# Patient Record
Sex: MALE | Race: WHITE | NOT HISPANIC OR LATINO | Employment: FULL TIME | URBAN - METROPOLITAN AREA
[De-identification: names, ages, dates, MRNs, and addresses within clinical notes are randomized per-mention and may not be internally consistent; named-entity substitution may affect disease eponyms.]

---

## 2019-12-20 ENCOUNTER — HOSPITAL ENCOUNTER (EMERGENCY)
Facility: HOSPITAL | Age: 34
Discharge: HOME | End: 2019-12-20
Attending: EMERGENCY MEDICINE | Admitting: EMERGENCY MEDICINE
Payer: COMMERCIAL

## 2019-12-20 VITALS
TEMPERATURE: 98.4 F | DIASTOLIC BLOOD PRESSURE: 78 MMHG | HEART RATE: 88 BPM | OXYGEN SATURATION: 98 % | RESPIRATION RATE: 18 BRPM | WEIGHT: 218 LBS | SYSTOLIC BLOOD PRESSURE: 144 MMHG

## 2019-12-20 DIAGNOSIS — J22 ACUTE RESPIRATORY INFECTION: Primary | ICD-10-CM

## 2019-12-20 PROCEDURE — 99282 EMERGENCY DEPT VISIT SF MDM: CPT

## 2019-12-20 PROCEDURE — 99284 EMERGENCY DEPT VISIT MOD MDM: CPT | Performed by: EMERGENCY MEDICINE

## 2019-12-20 RX ORDER — AZITHROMYCIN 250 MG/1
TABLET, FILM COATED ORAL
Qty: 4 TABLET | Refills: 0 | Status: SHIPPED | OUTPATIENT
Start: 2019-12-20 | End: 2019-12-24

## 2019-12-20 RX ORDER — AZITHROMYCIN 250 MG/1
500 TABLET, FILM COATED ORAL ONCE
Status: COMPLETED | OUTPATIENT
Start: 2019-12-20 | End: 2019-12-20

## 2019-12-20 RX ADMIN — AZITHROMYCIN 500 MG: 250 TABLET, FILM COATED ORAL at 17:37

## 2019-12-20 NOTE — ED PROVIDER NOTES
History  Chief Complaint   Patient presents with    Cough     Pt reports productive cough for the last 1 week with greenish/yellow mucous  Pt reports coughing up blood this morning "deep dark red, clots " Pt sent here from   Pt denies SOB but reprots "weird sensation and feeling like my throat is closed "        History provided by:  Patient   used: No    Cough   Associated symptoms: no chest pain, no fever, no headaches, no rash, no shortness of breath and no wheezing    35year-old otherwise healthy male sent from urgent care for evaluation of respiratory symptoms over the last 2 weeks with some hemoptysis this morning which has since resolved and not recurred  Denies having any shortness of breath, fevers, chills  Cough has been productive with yellow/green mucus  Feels congested  No recent travel or sick contacts, leg pain or edema, recent surgeries, family history of DVT  On exam breath sounds are clear  Oropharynx moist   Heart sounds normal  Patient reports having a chest x-ray to Urgent Care which they told him was unremarkable  Patient has low risk Wells score for PE  Given his respiratory illness symptoms most likely related to airway irritation, occult pneumonia  Plan oral antibiotic, discharge home and discussed returning with any worsening symptoms  None       History reviewed  No pertinent past medical history  History reviewed  No pertinent surgical history  History reviewed  No pertinent family history  I have reviewed and agree with the history as documented  Social History     Tobacco Use    Smoking status: Never Smoker   Substance Use Topics    Alcohol use: Not Currently    Drug use: Not Currently        Review of Systems   Constitutional: Negative for activity change, appetite change, fatigue and fever  Respiratory: Positive for cough  Negative for chest tightness, shortness of breath and wheezing  Cardiovascular: Negative for chest pain  Gastrointestinal: Negative for abdominal pain, nausea and vomiting  Musculoskeletal: Negative for back pain and neck pain  Skin: Negative for color change and rash  Neurological: Negative for dizziness, weakness, light-headedness and headaches  All other systems reviewed and are negative  Physical Exam  Physical Exam   Constitutional: He is oriented to person, place, and time  He appears well-developed and well-nourished  No distress  HENT:   Head: Normocephalic  Mouth/Throat: Oropharynx is clear and moist    Neck: Normal range of motion  Neck supple  Cardiovascular: Normal rate and regular rhythm  Pulmonary/Chest: Effort normal and breath sounds normal  He has no wheezes  He has no rales  Musculoskeletal: Normal range of motion  He exhibits no edema or tenderness  Neurological: He is alert and oriented to person, place, and time  Skin: Skin is warm and dry  Psychiatric: He has a normal mood and affect  His behavior is normal    Nursing note and vitals reviewed        Vital Signs  ED Triage Vitals [12/20/19 1643]   Temperature Pulse Respirations Blood Pressure SpO2   98 4 °F (36 9 °C) 88 18 144/78 98 %      Temp Source Heart Rate Source Patient Position - Orthostatic VS BP Location FiO2 (%)   Oral Monitor Sitting Left arm --      Pain Score       --           Vitals:    12/20/19 1643   BP: 144/78   Pulse: 88   Patient Position - Orthostatic VS: Sitting         Visual Acuity      ED Medications  Medications   azithromycin (ZITHROMAX) tablet 500 mg (500 mg Oral Given 12/20/19 1737)       Diagnostic Studies  Results Reviewed     None                 No orders to display              Procedures  Procedures         ED Course                         Sunny' Criteria for PE      Most Recent Value   Wells' Criteria for PE   Clinical signs and symptoms of DVT  0 Filed at: 12/20/2019 1733   PE is primary diagnosis or equally likely  0 Filed at: 12/20/2019 1733   HR >100  0 Filed at: 12/20/2019 1733   Immobilization at least 3 days or Surgery in the previous 4 weeks  0 Filed at: 12/20/2019 1733   Previous, objectively diagnosed PE or DVT  0 Filed at: 12/20/2019 1733   Hemoptysis  1 Filed at: 12/20/2019 1733   Malignancy with treatment within 6 months or palliative  0 Filed at: 12/20/2019 1733   Wells' Criteria Total  1 Filed at: 12/20/2019 1733            [facility]  Number of Diagnoses or Management Options  Acute respiratory infection: new and requires workup  Diagnosis management comments: 40-year-old otherwise healthy male sent from urgent care for evaluation of an episode of hemoptysis this morning  Has not recurred  No shortness of breath, chest pain  He has been sick with a respiratory illness for the past 2 weeks  Reportedly had a normal chest x-ray to Urgent Care  Patient has no risk factors for PE  Low risk Wells score  No further testing indicated at this time  Will treat with antibiotic for his respiratory illness and have him return with any worsening symptoms  Patient Progress  Patient progress: stable        Disposition  Final diagnoses:   Acute respiratory infection     Time reflects when diagnosis was documented in both MDM as applicable and the Disposition within this note     Time User Action Codes Description Comment    12/20/2019  5:27 PM Ryder [address] [place] [address] Acute respiratory infection       ED Disposition     ED Disposition Condition Date/Time Comment    Discharge Stable Fri Dec 20, 2019  5:27 PM Gena Roof discharge to home/self care              Follow-up Information     Follow up With Specialties Details Why Contact Info Additional [address] Emergency Department Emergency Medicine  If symptoms worsen [address] [facility] [address] [phone] [facility], [address], [place], [place], [address]          Patient's Medications   Discharge Prescriptions    AZITHROMYCIN (ZITHROMAX) 250 MG TABLET    1 tablet PO daily x 4 more days       Start Date: 12/20/2019End Date: 12/24/2019       Order Dose: --       Quantity: 4 tablet    Refills: 0     No discharge procedures on file      ED Provider  Electronically Signed by           Jerel Connell MD  12/20/19 1972